# Patient Record
Sex: FEMALE | Race: WHITE | URBAN - METROPOLITAN AREA
[De-identification: names, ages, dates, MRNs, and addresses within clinical notes are randomized per-mention and may not be internally consistent; named-entity substitution may affect disease eponyms.]

---

## 2023-07-06 ENCOUNTER — APPOINTMENT (OUTPATIENT)
Dept: URBAN - METROPOLITAN AREA CLINIC 217 | Age: 43
Setting detail: DERMATOLOGY
End: 2023-07-06

## 2023-07-06 DIAGNOSIS — D22 MELANOCYTIC NEVI: ICD-10-CM

## 2023-07-06 DIAGNOSIS — L40.4 GUTTATE PSORIASIS: ICD-10-CM

## 2023-07-06 PROBLEM — D22.5 MELANOCYTIC NEVI OF TRUNK: Status: ACTIVE | Noted: 2023-07-06

## 2023-07-06 PROCEDURE — OTHER OBSERVATION: OTHER

## 2023-07-06 PROCEDURE — OTHER OBSERVATION AND MEASURE: OTHER

## 2023-07-06 PROCEDURE — OTHER MIPS QUALITY: OTHER

## 2023-07-06 PROCEDURE — OTHER PRESCRIPTION: OTHER

## 2023-07-06 PROCEDURE — 99203 OFFICE O/P NEW LOW 30 MIN: CPT

## 2023-07-06 PROCEDURE — OTHER COUNSELING: OTHER

## 2023-07-06 RX ORDER — TAPINAROF 10 MG/1000MG
CREAM TOPICAL
Qty: 60 | Refills: 3 | Status: ERX | COMMUNITY
Start: 2023-07-06

## 2023-07-06 ASSESSMENT — BSA PSORIASIS: % BODY COVERED IN PSORIASIS: 1

## 2023-07-06 ASSESSMENT — PGA PSORIASIS: PGA PSORIASIS 2020: MILD

## 2023-07-06 ASSESSMENT — LOCATION DETAILED DESCRIPTION DERM: LOCATION DETAILED: RIGHT RIB CAGE

## 2023-07-06 ASSESSMENT — ITCH NUMERIC RATING SCALE: HOW SEVERE IS YOUR ITCHING?: 3

## 2023-07-06 ASSESSMENT — LOCATION SIMPLE DESCRIPTION DERM: LOCATION SIMPLE: ABDOMEN

## 2023-07-06 ASSESSMENT — LOCATION ZONE DERM: LOCATION ZONE: TRUNK

## 2023-07-06 NOTE — PROCEDURE: COUNSELING
Detail Level: Detailed
Skin Checks Recommendations: Monthly self-exams of the skin are prudent and yearly physician-assisted exams.
Sunscreen Recommendations: Patient was encouraged to apply a broad-spectrum sunscreen to exposed areas of skin of at least SPF > 30 and water resistent.  Physical sunscreens containing Zinc oxide and/or Titanium dioxide are useful when one is concerned about allergies to chemical sunscreens or avoiding chemicals on young children.

## 2023-07-13 RX ORDER — TAPINAROF 10 MG/1000MG
CREAM TOPICAL
Qty: 60 | Refills: 3 | Status: ERX

## 2025-02-19 ENCOUNTER — APPOINTMENT (OUTPATIENT)
Dept: URBAN - METROPOLITAN AREA CLINIC 217 | Age: 45
Setting detail: DERMATOLOGY
End: 2025-02-19

## 2025-02-19 DIAGNOSIS — D22 MELANOCYTIC NEVI: ICD-10-CM

## 2025-02-19 DIAGNOSIS — R20.2 PARESTHESIA OF SKIN: ICD-10-CM

## 2025-02-19 PROBLEM — D22.5 MELANOCYTIC NEVI OF TRUNK: Status: ACTIVE | Noted: 2025-02-19

## 2025-02-19 PROCEDURE — OTHER MIPS QUALITY: OTHER

## 2025-02-19 PROCEDURE — OTHER TREATMENT REGIMEN: OTHER

## 2025-02-19 PROCEDURE — 99212 OFFICE O/P EST SF 10 MIN: CPT | Mod: 25

## 2025-02-19 PROCEDURE — OTHER COUNSELING: OTHER

## 2025-02-19 PROCEDURE — OTHER SHAVE REMOVAL: OTHER

## 2025-02-19 PROCEDURE — OTHER REASSURANCE: OTHER

## 2025-02-19 PROCEDURE — 11302 SHAVE SKIN LESION 1.1-2.0 CM: CPT

## 2025-02-19 ASSESSMENT — LOCATION SIMPLE DESCRIPTION DERM
LOCATION SIMPLE: ABDOMEN
LOCATION SIMPLE: UPPER BACK

## 2025-02-19 ASSESSMENT — LOCATION DETAILED DESCRIPTION DERM
LOCATION DETAILED: SUPERIOR THORACIC SPINE
LOCATION DETAILED: RIGHT SUPERIOR FLANK

## 2025-02-19 ASSESSMENT — LOCATION ZONE DERM: LOCATION ZONE: TRUNK

## 2025-02-19 NOTE — HPI: BODY LOCATION - TRUNK
Additional History: Patient presents for a re-check of right side lesion. Itchiness has been there for 8 months.

## 2025-02-19 NOTE — PROCEDURE: COUNSELING
Detail Level: Detailed
Sunscreen Recommendations: Patient was encouraged to apply a broad-spectrum sunscreen to exposed areas of skin of at least SPF > 30 and water resistent.  Physical sunscreens containing Zinc oxide and/or Titanium dioxide are useful when one is concerned about allergies to chemical sunscreens or avoiding chemicals on young children.
Skin Checks Recommendations: Monthly self-exams of the skin are prudent and yearly physician-assisted exams.

## 2025-02-19 NOTE — PROCEDURE: TREATMENT REGIMEN
Samples Given: CereVe anti itch cream
Plan: There is no rash present will treat symptomatically. Patient unaware of any neck it cervical issue
Detail Level: Zone